# Patient Record
Sex: MALE | Race: WHITE | NOT HISPANIC OR LATINO | Employment: FULL TIME | URBAN - METROPOLITAN AREA
[De-identification: names, ages, dates, MRNs, and addresses within clinical notes are randomized per-mention and may not be internally consistent; named-entity substitution may affect disease eponyms.]

---

## 2017-05-06 ENCOUNTER — WALK IN (OUTPATIENT)
Dept: URGENT CARE | Age: 52
End: 2017-05-06

## 2017-05-06 VITALS
BODY MASS INDEX: 26.76 KG/M2 | HEART RATE: 80 BPM | TEMPERATURE: 98.6 F | OXYGEN SATURATION: 96 % | WEIGHT: 191.14 LBS | DIASTOLIC BLOOD PRESSURE: 64 MMHG | SYSTOLIC BLOOD PRESSURE: 110 MMHG | HEIGHT: 71 IN

## 2017-05-06 DIAGNOSIS — M79.605 LEFT LEG PAIN: ICD-10-CM

## 2017-05-06 DIAGNOSIS — R07.81 RIB PAIN ON LEFT SIDE: Primary | ICD-10-CM

## 2017-05-06 PROCEDURE — 99213 OFFICE O/P EST LOW 20 MIN: CPT | Performed by: NURSE PRACTITIONER

## 2021-12-30 ENCOUNTER — LAB SERVICES (OUTPATIENT)
Dept: URGENT CARE | Age: 56
End: 2021-12-30

## 2021-12-30 DIAGNOSIS — Z11.52 ENCOUNTER FOR SCREENING LABORATORY TESTING FOR COVID-19 VIRUS IN ASYMPTOMATIC PATIENT: Primary | ICD-10-CM

## 2021-12-30 DIAGNOSIS — Z01.812 ENCOUNTER FOR SCREENING LABORATORY TESTING FOR COVID-19 VIRUS IN ASYMPTOMATIC PATIENT: Primary | ICD-10-CM

## 2021-12-30 PROCEDURE — U0005 INFEC AGEN DETEC AMPLI PROBE: HCPCS | Performed by: INTERNAL MEDICINE

## 2021-12-30 PROCEDURE — U0003 INFECTIOUS AGENT DETECTION BY NUCLEIC ACID (DNA OR RNA); SEVERE ACUTE RESPIRATORY SYNDROME CORONAVIRUS 2 (SARS-COV-2) (CORONAVIRUS DISEASE [COVID-19]), AMPLIFIED PROBE TECHNIQUE, MAKING USE OF HIGH THROUGHPUT TECHNOLOGIES AS DESCRIBED BY CMS-2020-01-R: HCPCS | Performed by: INTERNAL MEDICINE

## 2021-12-31 LAB
SARS-COV-2 RNA RESP QL NAA+PROBE: NOT DETECTED
SERVICE CMNT-IMP: NORMAL
SERVICE CMNT-IMP: NORMAL

## 2022-03-10 ENCOUNTER — WALK IN (OUTPATIENT)
Dept: URGENT CARE | Age: 57
End: 2022-03-10

## 2022-03-10 VITALS
HEART RATE: 69 BPM | OXYGEN SATURATION: 96 % | BODY MASS INDEX: 26.81 KG/M2 | WEIGHT: 192.2 LBS | DIASTOLIC BLOOD PRESSURE: 86 MMHG | TEMPERATURE: 98.4 F | RESPIRATION RATE: 16 BRPM | SYSTOLIC BLOOD PRESSURE: 133 MMHG

## 2022-03-10 DIAGNOSIS — J20.8 VIRAL BRONCHITIS: Primary | ICD-10-CM

## 2022-03-10 PROCEDURE — 99214 OFFICE O/P EST MOD 30 MIN: CPT | Performed by: PHYSICIAN ASSISTANT

## 2022-03-10 RX ORDER — DICLOFENAC SODIUM 75 MG/1
TABLET, DELAYED RELEASE ORAL
COMMUNITY
Start: 2022-02-19

## 2022-03-10 RX ORDER — PREGABALIN 75 MG/1
75 CAPSULE ORAL
COMMUNITY
Start: 2022-02-24

## 2022-03-10 RX ORDER — MULTIVITAMIN WITH IRON
250 TABLET ORAL DAILY
COMMUNITY

## 2022-03-10 RX ORDER — PROPRANOLOL HYDROCHLORIDE 20 MG/1
20 TABLET ORAL 2 TIMES DAILY PRN
COMMUNITY
Start: 2021-10-22

## 2022-03-10 RX ORDER — BENZONATATE 200 MG/1
200 CAPSULE ORAL 3 TIMES DAILY PRN
Qty: 21 CAPSULE | Refills: 0 | Status: SHIPPED | OUTPATIENT
Start: 2022-03-10

## 2022-03-10 RX ORDER — VIT C/B6/B5/MAGNESIUM/HERB 173 50-5-6-5MG
CAPSULE ORAL
COMMUNITY

## 2022-03-10 RX ORDER — CHLORAL HYDRATE 500 MG
1 CAPSULE ORAL DAILY
COMMUNITY

## 2022-03-10 RX ORDER — PREDNISONE 20 MG/1
TABLET ORAL
Qty: 10 TABLET | Refills: 0 | Status: SHIPPED | OUTPATIENT
Start: 2022-03-10

## 2023-03-09 ENCOUNTER — TELEPHONE (OUTPATIENT)
Dept: PRIMARY CARE | Facility: CLINIC | Age: 58
End: 2023-03-09
Payer: COMMERCIAL

## 2023-03-09 NOTE — TELEPHONE ENCOUNTER
Pt had cpe on 2/8/23 - was billed incorrectly as level 3 - pt has already spoken to central billing - they stated it needs to be re billed as preventive care physical - any questions call Sim [phone]

## 2024-01-23 DIAGNOSIS — Z00.00 WELLNESS EXAMINATION: ICD-10-CM

## 2024-01-23 DIAGNOSIS — E55.9 VITAMIN D DEFICIENCY: Primary | ICD-10-CM

## 2024-01-23 DIAGNOSIS — Z12.5 PROSTATE CANCER SCREENING: ICD-10-CM

## 2024-01-23 DIAGNOSIS — E78.5 DYSLIPIDEMIA: ICD-10-CM

## 2024-02-03 ASSESSMENT — DERMATOLOGY QUALITY OF LIFE (QOL) ASSESSMENT
WHAT SINGLE SKIN CONDITION LISTED BELOW IS THE PATIENT ANSWERING THE QUALITY-OF-LIFE ASSESSMENT QUESTIONS ABOUT: NONE OF THE ABOVE
RATE HOW BOTHERED YOU ARE BY EFFECTS OF YOUR SKIN PROBLEMS ON YOUR ACTIVITIES (EG, GOING OUT, ACCOMPLISHING WHAT YOU WANT, WORK ACTIVITIES OR YOUR RELATIONSHIPS WITH OTHERS): 0 - NEVER BOTHERED
RATE HOW EMOTIONALLY BOTHERED YOU ARE BY YOUR SKIN PROBLEM (FOR EXAMPLE, WORRY, EMBARRASSMENT, FRUSTRATION): 0 - NEVER BOTHERED
RATE HOW BOTHERED YOU ARE BY EFFECTS OF YOUR SKIN PROBLEMS ON YOUR ACTIVITIES (EG, GOING OUT, ACCOMPLISHING WHAT YOU WANT, WORK ACTIVITIES OR YOUR RELATIONSHIPS WITH OTHERS): 0 - NEVER BOTHERED
RATE HOW BOTHERED YOU ARE BY SYMPTOMS OF YOUR SKIN PROBLEM (EG, ITCHING, STINGING BURNING, HURTING OR SKIN IRRITATION): 0 - NEVER BOTHERED
WHAT SINGLE SKIN CONDITION LISTED BELOW IS THE PATIENT ANSWERING THE QUALITY-OF-LIFE ASSESSMENT QUESTIONS ABOUT: NONE OF THE ABOVE
RATE HOW BOTHERED YOU ARE BY SYMPTOMS OF YOUR SKIN PROBLEM (EG, ITCHING, STINGING BURNING, HURTING OR SKIN IRRITATION): 0 - NEVER BOTHERED
RATE HOW EMOTIONALLY BOTHERED YOU ARE BY YOUR SKIN PROBLEM (FOR EXAMPLE, WORRY, EMBARRASSMENT, FRUSTRATION): 0 - NEVER BOTHERED
DATE THE QUALITY-OF-LIFE ASSESSMENT WAS COMPLETED: 66873

## 2024-02-03 ASSESSMENT — PATIENT GLOBAL ASSESSMENT (PGA): WHAT IS THE PGA: PATIENT GLOBAL ASSESSMENT:  1 - CLEAR

## 2024-02-07 ENCOUNTER — LAB (OUTPATIENT)
Dept: LAB | Facility: LAB | Age: 59
End: 2024-02-07
Payer: COMMERCIAL

## 2024-02-07 ENCOUNTER — OFFICE VISIT (OUTPATIENT)
Dept: DERMATOLOGY | Facility: CLINIC | Age: 59
End: 2024-02-07
Payer: COMMERCIAL

## 2024-02-07 DIAGNOSIS — Z12.83 SKIN CANCER SCREENING: Primary | ICD-10-CM

## 2024-02-07 DIAGNOSIS — Z00.00 WELLNESS EXAMINATION: ICD-10-CM

## 2024-02-07 DIAGNOSIS — Z85.828 PERSONAL HISTORY OF SKIN CANCER: ICD-10-CM

## 2024-02-07 DIAGNOSIS — E78.5 DYSLIPIDEMIA: ICD-10-CM

## 2024-02-07 DIAGNOSIS — D22.9 NEVUS: ICD-10-CM

## 2024-02-07 DIAGNOSIS — L82.1 SEBORRHEIC KERATOSIS: ICD-10-CM

## 2024-02-07 DIAGNOSIS — L81.4 LENTIGO: ICD-10-CM

## 2024-02-07 DIAGNOSIS — E55.9 VITAMIN D DEFICIENCY: ICD-10-CM

## 2024-02-07 DIAGNOSIS — Z12.5 PROSTATE CANCER SCREENING: ICD-10-CM

## 2024-02-07 LAB
25(OH)D3 SERPL-MCNC: 20 NG/ML (ref 30–100)
ANION GAP SERPL CALC-SCNC: 13 MMOL/L (ref 10–20)
BUN SERPL-MCNC: 13 MG/DL (ref 6–23)
CALCIUM SERPL-MCNC: 9.6 MG/DL (ref 8.6–10.3)
CHLORIDE SERPL-SCNC: 102 MMOL/L (ref 98–107)
CHOLEST SERPL-MCNC: 220 MG/DL (ref 0–199)
CHOLESTEROL/HDL RATIO: 2.8
CO2 SERPL-SCNC: 29 MMOL/L (ref 21–32)
CREAT SERPL-MCNC: 1.01 MG/DL (ref 0.5–1.3)
EGFRCR SERPLBLD CKD-EPI 2021: 86 ML/MIN/1.73M*2
GLUCOSE SERPL-MCNC: 94 MG/DL (ref 74–99)
HDLC SERPL-MCNC: 78.2 MG/DL
LDLC SERPL CALC-MCNC: 127 MG/DL
NON HDL CHOLESTEROL: 142 MG/DL (ref 0–149)
POTASSIUM SERPL-SCNC: 4.4 MMOL/L (ref 3.5–5.3)
PSA SERPL-MCNC: 1.27 NG/ML
SODIUM SERPL-SCNC: 140 MMOL/L (ref 136–145)
TRIGL SERPL-MCNC: 73 MG/DL (ref 0–149)
VLDL: 15 MG/DL (ref 0–40)

## 2024-02-07 PROCEDURE — 80048 BASIC METABOLIC PNL TOTAL CA: CPT

## 2024-02-07 PROCEDURE — 82306 VITAMIN D 25 HYDROXY: CPT

## 2024-02-07 PROCEDURE — 99213 OFFICE O/P EST LOW 20 MIN: CPT | Performed by: NURSE PRACTITIONER

## 2024-02-07 PROCEDURE — 80061 LIPID PANEL: CPT

## 2024-02-07 PROCEDURE — 84153 ASSAY OF PSA TOTAL: CPT

## 2024-02-07 PROCEDURE — 36415 COLL VENOUS BLD VENIPUNCTURE: CPT

## 2024-02-07 NOTE — PROGRESS NOTES
Subjective     Chavez Brasher is a 58 y.o. male who presents for the following: Skin Check.     Established patient in for yearly full body skin exam.     Review of Systems:  No other skin or systemic complaints other than what is documented elsewhere in the note.    The following portions of the chart were reviewed this encounter and updated as appropriate:       Skin Cancer History  BCC- Left cheek- 2020  Specialty Problems    None    Past Medical History:  Chavez Brasher  has a past medical history of Body mass index (BMI) 34.0-34.9, adult (01/28/2018), Calculus of ureter (02/15/2017), Contact with and (suspected) exposure to other viral communicable diseases (02/23/2021), Other conditions influencing health status (10/10/2017), Other conditions influencing health status (01/28/2018), Other conditions influencing health status (03/02/2017), and Unspecified abdominal pain (02/15/2017).    Past Surgical History:  Chavez Brasher  has no past surgical history on file.    Family History:  Patient family history is not on file.    Social History:  Chavez Brasher  has no history on file for tobacco use, alcohol use, and drug use.    Allergies:  Patient has no known allergies.    Current Medications / CAM's:  No current outpatient medications on file.     Objective   Well appearing patient in no apparent distress; mood and affect are within normal limits.    A full examination was performed including scalp, head, eyes, ears, nose, lips, neck, chest, axillae, abdomen, back, buttocks, bilateral upper extremities, bilateral lower extremities, hands, feet, fingers, toes, fingernails, and toenails. All findings within normal limits unless otherwise noted below.    Assessment/Plan   1. Skin cancer screening    The patient presented for a routine skin examination today. There are no specific concerns regarding skin health and no new or changing moles, lesions, or rashes.     Assessment: Based on the  comprehensive skin examination, there were no concerning or abnormal findings. The patient's skin appeared to be in good health, without any notable dermatologic conditions or lesions.    Plan: Given the absence of any significant skin findings, no specific interventions or treatments are warranted at this time. The patient was educated on the importance of regular skin self-examinations and advised to promptly report any changes or concerns. Routine follow-up for a skin examination was recommended.    -These lesions have benign, reassuring patterns on dermoscopy.  -There were no concerning features found on exam today.  -Recommend continued self-observation, and to contact the office if any changes in nevi are  noticed.    Discussed/information given on safe sun practices and use of sunscreen, sun protective clothing or sun avoidance. Recommend to use OTC medication of sunscreen SPF 30 or higher on a daily basis prior to sun exposure to reduce the risk of skin cancer.    Contact Office if: Any lesions change in size, shape or color; itch, bum or bleed.         2. Personal history of skin cancer  Small area of hypertrophic scarring to medial aspect of scar.  Continue to monitor.  Patient was made aware of symptoms requiring biopsy.    No evidence of recurrence in scar and benign ROS.   Continue with total body skin exams every 12 months.  ABCDEs of melanoma and atypical moles were discussed with the patient.  Patient was instructed to perform monthly self skin examination.  We recommended that the patient have regular full skin exams given an increased risk of subsequent skin cancers.  The patient was instructed to use sun protective behaviors including use of broad spectrum sunscreens and sun protective clothing to reduce risk of skin cancers.      3. Nevus  Multiple benign appearing flesh colored to pigmented macules and papules     Plan: Counseling.  I counseled the patient regarding the following:  Instructions:  "Monthly self-skin checks to monitor for any changes in moles are recommended. Expectations: Benign Nevi are pigmented nests of cells within the skin.No treatment is necessary. Contact Office if: Any moles change in size, shape or color; itch, bum or bleed.    4. Lentigo  Scattered tan macules in sun-exposed areas.    Solar lentigo (a type of lentigo also known as a senile lentigo, age spot, or liver spot) is a benign pigmented macule appearing on fair-skinned individuals that is related to ultraviolet radiation (UVR) exposure, typically from the sun.     PLAN:  Limiting sun exposure through avoidance, protective clothing, and use of sunscreens can help prevent the appearance of solar lentigines.    If lesion changes or becomes symptomatic she should return to clinic    5. Seborrheic keratosis    Seborrheic keratoses (SKs) are extremely common benign neoplasms of the skin. There can be few or hundreds of these raised, \"stuck-on\"-appearing papules and plaques with well-defined borders. The cause is unknown, although there is a familial trait for the development of multiple SKs.      SKs tend to increase in incidence and number with increasing age.     Skin Care: Seborrheic Keratoses are benign. No treatment is necessary.    Patient was instructed to call the office if any lesions become irritated or inflamed               "

## 2024-02-08 NOTE — RESULT ENCOUNTER NOTE
Results reviewed. No urgent findings.  Will Review results in detail at upcoming office appointment scheduled soon.      Isauro Mcdonnell MD

## 2024-02-15 ASSESSMENT — PROMIS GLOBAL HEALTH SCALE
CARRYOUT_PHYSICAL_ACTIVITIES: COMPLETELY
EMOTIONAL_PROBLEMS: RARELY
CARRYOUT_SOCIAL_ACTIVITIES: EXCELLENT
RATE_QUALITY_OF_LIFE: EXCELLENT
RATE_GENERAL_HEALTH: EXCELLENT
RATE_SOCIAL_SATISFACTION: EXCELLENT
RATE_MENTAL_HEALTH: EXCELLENT
RATE_AVERAGE_PAIN: 2
RATE_PHYSICAL_HEALTH: EXCELLENT

## 2024-02-22 ENCOUNTER — OFFICE VISIT (OUTPATIENT)
Dept: PRIMARY CARE | Facility: CLINIC | Age: 59
End: 2024-02-22
Payer: COMMERCIAL

## 2024-02-22 VITALS
BODY MASS INDEX: 29.65 KG/M2 | TEMPERATURE: 98.2 F | WEIGHT: 231 LBS | HEIGHT: 74 IN | DIASTOLIC BLOOD PRESSURE: 75 MMHG | RESPIRATION RATE: 16 BRPM | SYSTOLIC BLOOD PRESSURE: 113 MMHG | OXYGEN SATURATION: 97 % | HEART RATE: 62 BPM

## 2024-02-22 DIAGNOSIS — R00.1 BRADYCARDIA: ICD-10-CM

## 2024-02-22 DIAGNOSIS — M25.552 PAIN OF BOTH HIP JOINTS: ICD-10-CM

## 2024-02-22 DIAGNOSIS — C44.91 BASAL CELL CARCINOMA (BCC), UNSPECIFIED SITE: ICD-10-CM

## 2024-02-22 DIAGNOSIS — E78.5 DYSLIPIDEMIA: ICD-10-CM

## 2024-02-22 DIAGNOSIS — M25.551 PAIN OF BOTH HIP JOINTS: ICD-10-CM

## 2024-02-22 DIAGNOSIS — N40.0 BENIGN PROSTATIC HYPERPLASIA WITHOUT LOWER URINARY TRACT SYMPTOMS: Primary | ICD-10-CM

## 2024-02-22 DIAGNOSIS — E55.9 VITAMIN D DEFICIENCY: ICD-10-CM

## 2024-02-22 PROBLEM — E66.3 OVERWEIGHT: Status: ACTIVE | Noted: 2024-02-22

## 2024-02-22 PROBLEM — E66.9 OBESITY: Status: RESOLVED | Noted: 2024-02-22 | Resolved: 2024-02-22

## 2024-02-22 PROBLEM — M21.42 FALLEN ARCHES: Status: ACTIVE | Noted: 2024-02-22

## 2024-02-22 PROBLEM — K57.90 DIVERTICULAR DISEASE: Status: ACTIVE | Noted: 2024-02-22

## 2024-02-22 PROBLEM — E66.9 OBESITY: Status: ACTIVE | Noted: 2024-02-22

## 2024-02-22 PROBLEM — L82.1 OTHER SEBORRHEIC KERATOSIS: Status: ACTIVE | Noted: 2023-02-08

## 2024-02-22 PROBLEM — D18.01 HEMANGIOMA OF SKIN AND SUBCUTANEOUS TISSUE: Status: ACTIVE | Noted: 2023-02-08

## 2024-02-22 PROBLEM — M25.569 KNEE PAIN: Status: ACTIVE | Noted: 2024-02-22

## 2024-02-22 PROBLEM — L23.7 POISON IVY: Status: RESOLVED | Noted: 2024-02-22 | Resolved: 2024-02-22

## 2024-02-22 PROBLEM — L23.7 POISON IVY: Status: ACTIVE | Noted: 2024-02-22

## 2024-02-22 PROBLEM — L98.9 SKIN LESION: Status: ACTIVE | Noted: 2024-02-22

## 2024-02-22 PROBLEM — N20.0 KIDNEY STONES: Status: ACTIVE | Noted: 2024-02-22

## 2024-02-22 PROBLEM — L81.4 LENTIGINOSIS: Status: ACTIVE | Noted: 2024-02-22

## 2024-02-22 PROBLEM — D49.2 SKIN GROWTH: Status: ACTIVE | Noted: 2024-02-22

## 2024-02-22 PROBLEM — D48.5 NEOPLASM OF UNCERTAIN BEHAVIOR OF SKIN: Status: ACTIVE | Noted: 2023-02-08

## 2024-02-22 PROBLEM — M21.40 ACQUIRED PES PLANUS: Status: ACTIVE | Noted: 2024-02-22

## 2024-02-22 PROBLEM — K64.9 HEMORRHOIDS: Status: ACTIVE | Noted: 2024-02-22

## 2024-02-22 PROBLEM — D22.5 MELANOCYTIC NEVI OF TRUNK: Status: ACTIVE | Noted: 2023-02-08

## 2024-02-22 PROBLEM — L72.3 SEBACEOUS CYST: Status: ACTIVE | Noted: 2024-02-22

## 2024-02-22 PROBLEM — Z85.828 PERSONAL HISTORY OF OTHER MALIGNANT NEOPLASM OF SKIN: Status: ACTIVE | Noted: 2023-02-08

## 2024-02-22 PROBLEM — M21.41 FALLEN ARCHES: Status: ACTIVE | Noted: 2024-02-22

## 2024-02-22 PROBLEM — L91.0 HYPERTROPHIC SCAR: Status: ACTIVE | Noted: 2022-03-16

## 2024-02-22 PROCEDURE — 93000 ELECTROCARDIOGRAM COMPLETE: CPT | Performed by: INTERNAL MEDICINE

## 2024-02-22 PROCEDURE — 99396 PREV VISIT EST AGE 40-64: CPT | Performed by: INTERNAL MEDICINE

## 2024-02-22 PROCEDURE — 1036F TOBACCO NON-USER: CPT | Performed by: INTERNAL MEDICINE

## 2024-02-22 RX ORDER — CHOLECALCIFEROL (VITAMIN D3) 50 MCG
50 TABLET ORAL DAILY
Qty: 30 TABLET | Refills: 11 | COMMUNITY
Start: 2024-02-22

## 2024-02-22 RX ORDER — GLUCOSAMINE/D3/BOSWELLIA SERRA 1500MG-400
TABLET ORAL
COMMUNITY
Start: 2016-10-20 | End: 2024-02-22 | Stop reason: WASHOUT

## 2024-02-22 RX ORDER — IBUPROFEN 200 MG
TABLET ORAL
COMMUNITY
Start: 2023-02-10

## 2024-02-22 ASSESSMENT — ENCOUNTER SYMPTOMS
OCCASIONAL FEELINGS OF UNSTEADINESS: 0
LOSS OF SENSATION IN FEET: 0
DEPRESSION: 0

## 2024-02-22 ASSESSMENT — PATIENT HEALTH QUESTIONNAIRE - PHQ9
1. LITTLE INTEREST OR PLEASURE IN DOING THINGS: NOT AT ALL
2. FEELING DOWN, DEPRESSED OR HOPELESS: NOT AT ALL
SUM OF ALL RESPONSES TO PHQ9 QUESTIONS 1 AND 2: 0

## 2024-02-22 NOTE — PROGRESS NOTES
"Subjective   Patient ID: Chavez Brasher is a 58 y.o. male who presents for Annual Exam.    Here for wellness visit.  Feels good.  He has lost significant weight with diet and exercise changes.  No exertional chest pain, palpitations, dizziness, orthopnea or pedal edema.         Review of Systems    Objective   /75 (BP Location: Left arm, Patient Position: Sitting, BP Cuff Size: Large adult)   Pulse 62   Temp 36.8 °C (98.2 °F)   Resp 16   Ht 1.88 m (6' 2\")   Wt 105 kg (231 lb)   SpO2 97%   BMI 29.66 kg/m²     Physical Exam  Vitals reviewed.   Constitutional:       Appearance: Normal appearance.   HENT:      Head: Normocephalic and atraumatic.      Right Ear: Tympanic membrane normal.      Left Ear: Tympanic membrane normal.   Eyes:      General: No scleral icterus.        Right eye: No discharge.         Left eye: No discharge.      Extraocular Movements: Extraocular movements intact.      Conjunctiva/sclera: Conjunctivae normal.      Pupils: Pupils are equal, round, and reactive to light.   Cardiovascular:      Rate and Rhythm: Normal rate and regular rhythm.      Pulses: Normal pulses.      Heart sounds: Normal heart sounds. No murmur heard.  Pulmonary:      Effort: Pulmonary effort is normal.      Breath sounds: Normal breath sounds. No wheezing or rhonchi.   Musculoskeletal:         General: No deformity or signs of injury. Normal range of motion.      Cervical back: Normal range of motion and neck supple. No rigidity or tenderness.   Lymphadenopathy:      Cervical: No cervical adenopathy.   Skin:     General: Skin is warm and dry.      Findings: No rash.   Neurological:      General: No focal deficit present.      Mental Status: He is alert and oriented to person, place, and time. Mental status is at baseline.      Cranial Nerves: No cranial nerve deficit.      Sensory: No sensory deficit.      Gait: Gait normal.   Psychiatric:         Mood and Affect: Mood normal.         Behavior: Behavior " normal.         Thought Content: Thought content normal.         Judgment: Judgment normal.         Assessment/Plan   Problem List Items Addressed This Visit             ICD-10-CM    Basal cell carcinoma (BCC) C44.91    Benign prostatic hyperplasia without lower urinary tract symptoms - Primary N40.0    Dyslipidemia E78.5    Relevant Orders    ECG 12 lead (Clinic Performed) (Completed)    Pain of both hip joints M25.551, M25.552    Bradycardia R00.1    Relevant Orders    ECG 12 lead (Clinic Performed) (Completed)       Lab on 02/07/2024   Component Date Value Ref Range Status    Glucose 02/07/2024 94  74 - 99 mg/dL Final    Sodium 02/07/2024 140  136 - 145 mmol/L Final    Potassium 02/07/2024 4.4  3.5 - 5.3 mmol/L Final    Chloride 02/07/2024 102  98 - 107 mmol/L Final    Bicarbonate 02/07/2024 29  21 - 32 mmol/L Final    Anion Gap 02/07/2024 13  10 - 20 mmol/L Final    Urea Nitrogen 02/07/2024 13  6 - 23 mg/dL Final    Creatinine 02/07/2024 1.01  0.50 - 1.30 mg/dL Final    eGFR 02/07/2024 86  >60 mL/min/1.73m*2 Final    Calculations of estimated GFR are performed using the 2021 CKD-EPI Study Refit equation without the race variable for the IDMS-Traceable creatinine methods.  https://jasn.asnjournals.org/content/early/2021/09/22/ASN.4067758650    Calcium 02/07/2024 9.6  8.6 - 10.3 mg/dL Final    Cholesterol 02/07/2024 220 (H)  0 - 199 mg/dL Final          Age      Desirable   Borderline High   High     0-19 Y     0 - 169       170 - 199     >/= 200    20-24 Y     0 - 189       190 - 224     >/= 225         >24 Y     0 - 199       200 - 239     >/= 240   **All ranges are based on fasting samples. Specific   therapeutic targets will vary based on patient-specific   cardiac risk.    Pediatric guidelines reference:Pediatrics 2011, 128(S5).Adult guidelines reference: NCEP ATPIII Guidelines,FRAN 2001, 258:2486-97    Venipuncture immediately after or during the administration of Metamizole may lead to falsely low results.  Testing should be performed immediately prior to Metamizole dosing.    HDL-Cholesterol 02/07/2024 78.2  mg/dL Final      Age       Very Low   Low     Normal    High    0-19 Y    < 35      < 40     40-45     ----  20-24 Y    ----     < 40      >45      ----        >24 Y      ----     < 40     40-60      >60      Cholesterol/HDL Ratio 02/07/2024 2.8   Final      Ref Values  Desirable  < 3.4  High Risk  > 5.0    LDL Calculated 02/07/2024 127 (H)  <=99 mg/dL Final                                Near   Borderline      AGE      Desirable  Optimal    High     High     Very High     0-19 Y     0 - 109     ---    110-129   >/= 130     ----    20-24 Y     0 - 119     ---    120-159   >/= 160     ----      >24 Y     0 -  99   100-129  130-159   160-189     >/=190      VLDL 02/07/2024 15  0 - 40 mg/dL Final    Triglycerides 02/07/2024 73  0 - 149 mg/dL Final       Age         Desirable   Borderline High   High     Very High   0 D-90 D    19 - 174         ----         ----        ----  91 D- 9 Y     0 -  74        75 -  99     >/= 100      ----    10-19 Y     0 -  89        90 - 129     >/= 130      ----    20-24 Y     0 - 114       115 - 149     >/= 150      ----         >24 Y     0 - 149       150 - 199    200- 499    >/= 500    Venipuncture immediately after or during the administration of Metamizole may lead to falsely low results. Testing should be performed immediately prior to Metamizole dosing.    Non HDL Cholesterol 02/07/2024 142  0 - 149 mg/dL Final          Age       Desirable   Borderline High   High     Very High     0-19 Y     0 - 119       120 - 144     >/= 145    >/= 160    20-24 Y     0 - 149       150 - 189     >/= 190      ----         >24 Y    30 mg/dL above LDL Cholesterol goal      Prostate Specific AG 02/07/2024 1.27  <=4.00 ng/mL Final    Vitamin D, 25-Hydroxy, Total 02/07/2024 20 (L)  30 - 100 ng/mL Final            Hx chest heaviness episodes-history and exam nondiagnostic. Though his symptoms are not  classic for angina, considering history age and elevated cholesterol, stress echo indicated/ordered                This turns out he opted not to do these.  No recent chest symptoms.     Borderline elevated 10-year cardiac risk-6.5% February 2023-discussed statin, and projected out, in 5 years, at age 62 his 10-year risk would be over 10% assuming the same lipid panel as it we have now. He understands this and for now we will postpone his statin reassess next year     Exercise routine -walking his dog every morning.  Now back at gym jogs 3 times on treadmill, 30 min, running mostly weekly; Rides bike 15 miles to work in summer,      Elevated weight/dyslipidemia / BMI at 33- last time discussed weight loss efforts. Specifically Encouraged Calorie Counting Applications Such As Loose It, last visit, which he did. He will continue regular fitness efforts and check labs before follow-up. This time suggested he consider using a fitness application rather than his fitness bit device, I suggested using a software to track exercise w/ [employer]. Clearly more exercise more often is a priority. 10 yr cardiac risk 3/2% Sept '17. Last time-discussed his elevated weight and how his joint pain would likely advance faster next decade if weight loss did not become a priority. Encouraged using the lose it patito for 12 weeks towards a goal of losing 12 pounds over the 12-week period.   Presently in 2023, he is restarted his diet efforts at restricting calories and better choices with less fat and now back on the treadmill and working out. We discussed at length his need to as his weight has gone up focus on weight loss-over the last 6 years since his initial visit here in 2017. I informed patient BMI> 30, w/ recommendations for nutrition and exercise plan to help achieve weight reduction. His goal is to get down over the next year, 1 pound a week, ultimately to a weight goal of 220 pounds, which would put his BMI 28 which would be a  great improvement from where it is at now at 33              2/24-with diet and weight changes, he has lost 33 pounds in 12 months.  Discussed the concept of a ceiling weight to help keep his weight gain weight again.  He will continue to look to optimize his diet intake accordingly    Dyslipidemia-goal LDL under 100.            2/24-lipids a bit better with weight loss.      Vitamin D deficiency-encouraged consistent vitamin D replacement year-round, 2000 units     Hx nephrolithiasis spring '17- he will continue to push fluids. He has remained completely asymptomatic, and so the assumption is that he passed the stone. He will follow-up with urology, with any recurrent symptoms. He is well aware of this in terms as his father has had numerous renal stone episodes.             No concerns presently     Chronic elbow pain with history of left lateral epicondylitis- long-standing issues worsened with chopping wood which she does often. Encouraged dynamic stretching often to help this. He uses an elbow tennis elbow brace with activity              Not active problem. He uses elbow braces with activity, but he still has to remain quite cautious     Hip pain/knee pain-occasionally noted. At this point he does not want to give up his jogging.     Metatarsalgia/history of plantar fasciitis/fallen arches-he will continue to optimize arch support. Inserts help. Fortunately the plantar fasciitis has not recurred, though he does note he has occasional feet pain later after workouts              Shoe inserts and although issues have helped     lactose intolerance concerns - improved digestive issues off dairy     Colon cancer screening- colonoscopy updated 6/17. next in 10 yrs, 6/27. encouraged lots of fiber and fluids to prevent constipation RE incidental hemorrhoids and diverticuli     BPH- annual PSA continues for prostate cancer screening. Nocturia not a present concern. PSA normal Feb '24        Skin cancer- basal  cell cancer - left cheek - July 20, w/ further resection Sep '20. Suggested f/u at least annually, sooner w/ any concerning lesions. Sun screen reiterated             He follows annually w/ Shirin Hernandez / CASSANDRA Matthew each Feb. last appt. last wk.     Dental care-encouraged semiannual dental visits. Shira Slade in [place]. UTD he notes     eye exams-he uses reading glasses (1.5) - He will consider updating this soon. Dr. Duffy   It's been several yrs. suggested he update this. Once again encouraged this        Encouraged safety at home, last time, including eye and ear protection around machinery such as lawnmowers or power tools. Seatbelts and helmets - he uses w/ his cycle - also encouraged. Sunscreen encouraged as well. Safety on ladders important too w/ gutter and leave season approaching.     Family concerns-his son and daughter-in-law in [place] have a stressful situation-she is pregnant with her fourth baby, with fetal hydrops at 30 weeks.  Support provided     Flu shot encouraged each fall than he has declined this last year             Flu shot encouraged considering his grandchildren     Covid series-declined    RSV vaccination-recommended at age 59 in light of his grandchildren    Shingrix series-he had shingles in his 20s.  I told him that this can happen again and now that he is 49 the Shingrix series would be an option for him as shingles cases can become more painful if they happen at an older age.  He will consider this.     Follow-up annually, sooner with concerns     Charting was completed using voice recognition technology and may include unintended errors.

## 2025-02-12 ENCOUNTER — APPOINTMENT (OUTPATIENT)
Dept: DERMATOLOGY | Facility: CLINIC | Age: 60
End: 2025-02-12
Payer: COMMERCIAL

## 2025-02-12 DIAGNOSIS — Z85.828 HISTORY OF NONMELANOMA SKIN CANCER: ICD-10-CM

## 2025-02-12 DIAGNOSIS — D22.9 NEVUS: Primary | ICD-10-CM

## 2025-02-12 DIAGNOSIS — Z12.83 SCREENING EXAM FOR SKIN CANCER: ICD-10-CM

## 2025-02-12 DIAGNOSIS — L81.4 LENTIGO: ICD-10-CM

## 2025-02-12 DIAGNOSIS — L82.1 SEBORRHEIC KERATOSIS: ICD-10-CM

## 2025-02-12 PROCEDURE — 99213 OFFICE O/P EST LOW 20 MIN: CPT | Performed by: NURSE PRACTITIONER

## 2025-02-12 PROCEDURE — 1036F TOBACCO NON-USER: CPT | Performed by: NURSE PRACTITIONER

## 2025-02-12 NOTE — PROGRESS NOTES
Subjective     Chavez Brasher is a 59 y.o. male who presents for the following: Skin Check.   Established patient in for yearly full body skin exam.       Review of Systems:  No other skin or systemic complaints other than what is documented elsewhere in the note.    The following portions of the chart were reviewed this encounter and updated as appropriate:       Skin Cancer History  BCC- Left cheek- 2020     Specialty Problems          Dermatology Problems    Hypertrophic scar    Basal cell carcinoma (BCC)    Hemangioma of skin and subcutaneous tissue    Melanocytic nevi of trunk    Neoplasm of uncertain behavior of skin    Other seborrheic keratosis    Personal history of other malignant neoplasm of skin    Lentiginosis    Sebaceous cyst    Skin growth    Skin lesion     Past Medical History:  Chavez Brasher  has a past medical history of Basal cell carcinoma (2020), Body mass index (BMI) 34.0-34.9, adult (01/28/2018), Calculus of ureter (02/15/2017), Contact with and (suspected) exposure to other viral communicable diseases (02/23/2021), Other conditions influencing health status (10/10/2017), Other conditions influencing health status (01/28/2018), Other conditions influencing health status (03/02/2017), Unspecified abdominal pain (02/15/2017), and Varicella (1970).    Past Surgical History:  Chavez Brasher  has a past surgical history that includes Skin biopsy (2020) and Mohs surgery (2020).    Family History:  Patient family history includes Alzheimer's disease in his mother; Arthritis in his sister; Esophageal cancer in his father; No Known Problems in his daughter, son, and son; lewey body dementia in his father.    Social History:  Chavez Brasher  reports that he has never smoked. He has never used smokeless tobacco. He reports that he does not drink alcohol and does not use drugs.    Allergies:  Patient has no known allergies.    Current Medications / CAM's:    Current Outpatient  Medications:     cholecalciferol (Vitamin D3) 50 MCG (2000 UT) tablet, Take 1 tablet (50 mcg) by mouth once daily., Disp: 30 tablet, Rfl: 11    ibuprofen 200 mg tablet, Take by mouth., Disp: , Rfl:     methylcellulose oral powder, Take 2 packets by mouth once daily. 2 caplets daily, Disp: , Rfl:      Objective   Well appearing patient in no apparent distress; mood and affect are within normal limits.      Assessment/Plan   1. Nevus  Uniform pigmented macule(s)/papule(s) with reassuring findings on dermoscopy    -Discussed nature of condition  -Reassurance, benign-appearing features on examination today  -Recommend continued observation    2. Lentigo  Tan macules    -Benign appearing on exam  -Reassurance, recommend observation    3. Seborrheic keratosis  Stuck on, waxy macule(s)/papule(s)/plaque(s) with comedo-like openings and milia like cysts    -Discussed nature of condition  -Reassurance, recommend continued observation    4. History of nonmelanoma skin cancer  Personal History of Non-Melanoma Skin Cancer  -Well healed scar(s) with no evidence of recurrence  -Discussed the need for annual or semi-annual skin examinations and to return sooner if any new or changing lesions are noticed. Patient verbalizes understanding    5. Screening exam for skin cancer

## 2025-02-27 ENCOUNTER — APPOINTMENT (OUTPATIENT)
Dept: PRIMARY CARE | Facility: CLINIC | Age: 60
End: 2025-02-27
Payer: COMMERCIAL

## 2025-09-08 ENCOUNTER — APPOINTMENT (OUTPATIENT)
Dept: PRIMARY CARE | Facility: CLINIC | Age: 60
End: 2025-09-08
Payer: COMMERCIAL

## 2026-02-18 ENCOUNTER — APPOINTMENT (OUTPATIENT)
Dept: DERMATOLOGY | Facility: CLINIC | Age: 61
End: 2026-02-18
Payer: COMMERCIAL